# Patient Record
Sex: MALE | URBAN - METROPOLITAN AREA
[De-identification: names, ages, dates, MRNs, and addresses within clinical notes are randomized per-mention and may not be internally consistent; named-entity substitution may affect disease eponyms.]

---

## 2023-06-21 ENCOUNTER — OFFICE VISIT (OUTPATIENT)
Dept: URBAN - METROPOLITAN AREA CLINIC 115 | Facility: CLINIC | Age: 53
End: 2023-06-21

## 2023-06-30 ENCOUNTER — OFFICE VISIT (OUTPATIENT)
Dept: URBAN - METROPOLITAN AREA CLINIC 115 | Facility: CLINIC | Age: 53
End: 2023-06-30
Payer: COMMERCIAL

## 2023-06-30 ENCOUNTER — WEB ENCOUNTER (OUTPATIENT)
Dept: URBAN - METROPOLITAN AREA CLINIC 115 | Facility: CLINIC | Age: 53
End: 2023-06-30

## 2023-06-30 ENCOUNTER — LAB OUTSIDE AN ENCOUNTER (OUTPATIENT)
Dept: URBAN - METROPOLITAN AREA CLINIC 115 | Facility: CLINIC | Age: 53
End: 2023-06-30

## 2023-06-30 VITALS
TEMPERATURE: 97.87 F | WEIGHT: 158 LBS | HEART RATE: 74 BPM | HEIGHT: 66 IN | BODY MASS INDEX: 25.39 KG/M2 | SYSTOLIC BLOOD PRESSURE: 125 MMHG | DIASTOLIC BLOOD PRESSURE: 83 MMHG

## 2023-06-30 DIAGNOSIS — Z12.11 COLON CANCER SCREENING: ICD-10-CM

## 2023-06-30 DIAGNOSIS — R10.13 EPIGASTRIC ABDOMINAL PAIN: ICD-10-CM

## 2023-06-30 DIAGNOSIS — K58.8 OTHER IRRITABLE BOWEL SYNDROME: ICD-10-CM

## 2023-06-30 PROBLEM — 12063002: Status: ACTIVE | Noted: 2023-06-30

## 2023-06-30 PROBLEM — 88111009: Status: ACTIVE | Noted: 2023-06-30

## 2023-06-30 PROBLEM — 10743008: Status: ACTIVE | Noted: 2023-06-30

## 2023-06-30 PROCEDURE — 99204 OFFICE O/P NEW MOD 45 MIN: CPT | Performed by: INTERNAL MEDICINE

## 2023-06-30 PROCEDURE — 99244 OFF/OP CNSLTJ NEW/EST MOD 40: CPT | Performed by: INTERNAL MEDICINE

## 2023-06-30 RX ORDER — HYOSCYAMINE SULFATE 0.12 MG/1
1 TABLET UNDER THE TONGUE AND ALLOW TO DISSOLVE  AS NEEDED FOR CRAMPING PAIN TABLET, ORALLY DISINTEGRATING ORAL THREE TIMES A DAY
Qty: 60 TABLET | Refills: 2 | OUTPATIENT
Start: 2023-06-30 | End: 2023-09-28

## 2023-06-30 RX ORDER — SODIUM, POTASSIUM,MAG SULFATES 17.5-3.13G
354 ML SOLUTION, RECONSTITUTED, ORAL ORAL
Qty: 1 KIT | Refills: 0 | OUTPATIENT
Start: 2023-06-30 | End: 2023-07-01

## 2023-06-30 NOTE — PHYSICAL EXAM GASTROINTESTINAL
Abdomen , soft, nontender, nondistended , no guarding or rigidity , no masses palpable , normal bowel sounds , Liver and Spleen , no hepatomegaly present , no hepatosplenomegaly , liver nontender , spleen not palpable  rectal : deferred

## 2023-06-30 NOTE — HPI-TODAY'S VISIT:
This patient was referred by Dr. Scott SANCHEZ, DANO Whitley for evaluation of weight loss . The copy of this note will be sent to the referring provider. 54 yo m here to get evaluation for weight loss 30 lbs.  He reports un intentional. C/o nausea and epigastric discomfort and lack of appetite. He was going through divorce and admits anxiety and depression. Anti depressants did not helpe him. But using THC gummies. He reports c/o rectal bleeding intermittent more with hard stools recently. He  reports having variable consistency stools. Pt had colonoscopy in 2012 , at that time he was dx with hemorroids.  Denies any fhx of colon cancer first degree relative. He was taking NSAIDS and has cut down. Denies any etoh use and reports anxiety under control.

## 2023-07-06 ENCOUNTER — LAB OUTSIDE AN ENCOUNTER (OUTPATIENT)
Dept: URBAN - METROPOLITAN AREA CLINIC 82 | Facility: CLINIC | Age: 53
End: 2023-07-06

## 2023-07-06 ENCOUNTER — OFFICE VISIT (OUTPATIENT)
Dept: URBAN - METROPOLITAN AREA SURGERY CENTER 13 | Facility: SURGERY CENTER | Age: 53
End: 2023-07-06
Payer: COMMERCIAL

## 2023-07-06 ENCOUNTER — TELEPHONE ENCOUNTER (OUTPATIENT)
Dept: URBAN - METROPOLITAN AREA CLINIC 82 | Facility: CLINIC | Age: 53
End: 2023-07-06

## 2023-07-06 ENCOUNTER — CLAIMS CREATED FROM THE CLAIM WINDOW (OUTPATIENT)
Dept: URBAN - METROPOLITAN AREA CLINIC 4 | Facility: CLINIC | Age: 53
End: 2023-07-06
Payer: COMMERCIAL

## 2023-07-06 DIAGNOSIS — K21.9 GASTRO-ESOPHAGEAL REFLUX DISEASE WITHOUT ESOPHAGITIS: ICD-10-CM

## 2023-07-06 DIAGNOSIS — K29.60 OTHER GASTRITIS WITHOUT BLEEDING: ICD-10-CM

## 2023-07-06 DIAGNOSIS — Z12.11 COLON CANCER SCREENING: ICD-10-CM

## 2023-07-06 DIAGNOSIS — K21.9 ESOPHAGEAL REFLUX: ICD-10-CM

## 2023-07-06 DIAGNOSIS — K30 DYSPEPSIA: ICD-10-CM

## 2023-07-06 DIAGNOSIS — K29.70 GASTRITIS, UNSPECIFIED, WITHOUT BLEEDING: ICD-10-CM

## 2023-07-06 PROBLEM — 89362005: Status: ACTIVE | Noted: 2023-07-06

## 2023-07-06 PROCEDURE — 88312 SPECIAL STAINS GROUP 1: CPT | Performed by: PATHOLOGY

## 2023-07-06 PROCEDURE — G8907 PT DOC NO EVENTS ON DISCHARG: HCPCS | Performed by: INTERNAL MEDICINE

## 2023-07-06 PROCEDURE — 43239 EGD BIOPSY SINGLE/MULTIPLE: CPT | Performed by: INTERNAL MEDICINE

## 2023-07-06 PROCEDURE — 88313 SPECIAL STAINS GROUP 2: CPT | Performed by: PATHOLOGY

## 2023-07-06 PROCEDURE — 45378 DIAGNOSTIC COLONOSCOPY: CPT | Performed by: INTERNAL MEDICINE

## 2023-07-06 PROCEDURE — 88342 IMHCHEM/IMCYTCHM 1ST ANTB: CPT | Performed by: PATHOLOGY

## 2023-07-06 PROCEDURE — 88305 TISSUE EXAM BY PATHOLOGIST: CPT | Performed by: PATHOLOGY

## 2023-07-06 RX ORDER — HYOSCYAMINE SULFATE 0.12 MG/1
1 TABLET UNDER THE TONGUE AND ALLOW TO DISSOLVE  AS NEEDED FOR CRAMPING PAIN TABLET, ORALLY DISINTEGRATING ORAL THREE TIMES A DAY
Qty: 60 TABLET | Refills: 2 | Status: ACTIVE | COMMUNITY
Start: 2023-06-30 | End: 2023-09-28

## 2023-07-31 ENCOUNTER — TELEPHONE ENCOUNTER (OUTPATIENT)
Dept: URBAN - METROPOLITAN AREA CLINIC 115 | Facility: CLINIC | Age: 53
End: 2023-07-31

## 2023-08-01 ENCOUNTER — TELEPHONE ENCOUNTER (OUTPATIENT)
Dept: URBAN - METROPOLITAN AREA CLINIC 115 | Facility: CLINIC | Age: 53
End: 2023-08-01

## 2023-09-26 ENCOUNTER — TELEPHONE ENCOUNTER (OUTPATIENT)
Dept: URBAN - METROPOLITAN AREA CLINIC 115 | Facility: CLINIC | Age: 53
End: 2023-09-26

## 2023-09-29 ENCOUNTER — OFFICE VISIT (OUTPATIENT)
Dept: URBAN - METROPOLITAN AREA CLINIC 115 | Facility: CLINIC | Age: 53
End: 2023-09-29
Payer: COMMERCIAL

## 2023-09-29 ENCOUNTER — TELEPHONE ENCOUNTER (OUTPATIENT)
Dept: URBAN - NONMETROPOLITAN AREA CLINIC 2 | Facility: CLINIC | Age: 53
End: 2023-09-29

## 2023-09-29 VITALS
TEMPERATURE: 98.2 F | HEART RATE: 99 BPM | WEIGHT: 160 LBS | BODY MASS INDEX: 25.71 KG/M2 | SYSTOLIC BLOOD PRESSURE: 124 MMHG | HEIGHT: 66 IN | DIASTOLIC BLOOD PRESSURE: 86 MMHG

## 2023-09-29 DIAGNOSIS — D18.03 LIVER HEMANGIOMA: ICD-10-CM

## 2023-09-29 DIAGNOSIS — K58.2 IRRITABLE BOWEL SYNDROME WITH BOTH CONSTIPATION AND DIARRHEA: ICD-10-CM

## 2023-09-29 DIAGNOSIS — K21.00 GASTROESOPHAGEAL REFLUX DISEASE WITH ESOPHAGITIS WITHOUT HEMORRHAGE: ICD-10-CM

## 2023-09-29 DIAGNOSIS — K64.4 EXTERNAL HEMORRHOID: ICD-10-CM

## 2023-09-29 DIAGNOSIS — K29.60 EROSIVE GASTRITIS: ICD-10-CM

## 2023-09-29 DIAGNOSIS — R63.4 WEIGHT LOSS: ICD-10-CM

## 2023-09-29 DIAGNOSIS — R10.13 DYSPEPSIA: ICD-10-CM

## 2023-09-29 PROBLEM — 10743008: Status: ACTIVE | Noted: 2023-09-29

## 2023-09-29 PROBLEM — 162031009: Status: ACTIVE | Noted: 2023-09-29

## 2023-09-29 PROBLEM — 1086791000119100: Status: ACTIVE | Noted: 2023-09-29

## 2023-09-29 PROBLEM — 23913003: Status: ACTIVE | Noted: 2023-09-29

## 2023-09-29 PROBLEM — 266433003: Status: ACTIVE | Noted: 2023-09-29

## 2023-09-29 PROCEDURE — 99214 OFFICE O/P EST MOD 30 MIN: CPT | Performed by: INTERNAL MEDICINE

## 2023-09-29 RX ORDER — AMITRIPTYLINE HYDROCHLORIDE 10 MG/1
1 TABLET AT BEDTIME TABLET, FILM COATED ORAL ONCE A DAY
Qty: 30 TABLET | Refills: 5 | OUTPATIENT
Start: 2023-09-29

## 2023-09-29 RX ORDER — ESOMEPRAZOLE MAGNESIUM 40 MG/1
1 CAPSULE CAPSULE, DELAYED RELEASE ORAL ONCE A DAY
Qty: 90 CAPSULE | Refills: 3 | OUTPATIENT
Start: 2023-09-29

## 2023-09-29 RX ORDER — HYDROCORTISONE 25 MG/G
1 APPLICATION CREAM TOPICAL TWICE A DAY
Qty: 60 GRAM | Refills: 3 | OUTPATIENT
Start: 2023-09-29 | End: 2024-01-26

## 2023-09-29 RX ORDER — HYOSCYAMINE SULFATE 0.12 MG/1
1 TABLET UNDER THE TONGUE AND ALLOW TO DISSOLVE  AS NEEDED FOR CRAMPING PAIN TABLET, ORALLY DISINTEGRATING ORAL THREE TIMES A DAY
Qty: 60 TABLET | Refills: 2
Start: 2023-06-30 | End: 2023-12-28

## 2023-09-29 RX ORDER — DICYCLOMINE HYDROCHLORIDE 10 MG/1
TAKE 1 CAPSULE BY MOUTH THREE TIMES A DAY CAPSULE ORAL
Qty: 90 CAPSULE | Refills: 1 | OUTPATIENT
Start: 2023-09-29 | End: 2023-11-27

## 2023-09-29 NOTE — HPI-TODAY'S VISIT:
This patient was referred by Dr. Scott SANCHEZ, DANO Whitley for evaluation of weight loss . The copy of this note will be sent to the referring provider. 52 yo m here to get evaluation for weight loss 30 lbs.  He reports un intentional. C/o nausea and epigastric discomfort and lack of appetite. He was going through divorce and admits anxiety and depression. Anti depressants did not helpe him. But using THC gummies. He reports c/o rectal bleeding intermittent more with hard stools recently. He  reports having variable consistency stools. Pt had colonoscopy in 2012 , at that time he was dx with hemorroids.  Denies any fhx of colon cancer first degree relative. He was taking NSAIDS and has cut down. Denies any etoh use and reports anxiety under control.  9/29/23 :

## 2023-09-29 NOTE — HPI-TODAY'S VISIT:
Mr. Pimentel was seen today for follow-up regarding his symptoms of abdominal pain bloating discomfort as well as cramping.  Patient stated.  He continues to have diarrhea episodes with intermittently.  Patient reports taking Imodium and can cause symptoms of lower abdominal bloating and discomfort.  Patient was also noted to have a reflux esophagitis as well as chronic gastritis.  Patient denies taking any iron supplements.  Patient stated he has been having more frequent loose stools and abdominal bloating.  Denies any recent antibiotic use.  Denies any sick contacts.  CT scan of the abdomen pelvis that was done recently showed 1 cm hepatic hemangioma.  He reports having weight loss ,Patient denies any daily use of NSAIDs.

## 2023-10-06 ENCOUNTER — TELEPHONE ENCOUNTER (OUTPATIENT)
Dept: URBAN - METROPOLITAN AREA CLINIC 92 | Facility: CLINIC | Age: 53
End: 2023-10-06

## 2023-10-06 RX ORDER — PANTOPRAZOLE SODIUM 40 MG/1
1 TABLET TABLET, DELAYED RELEASE ORAL ONCE A DAY
Qty: 90 TABLET | Refills: 1 | OUTPATIENT
Start: 2023-10-06

## 2023-12-13 ENCOUNTER — DASHBOARD ENCOUNTERS (OUTPATIENT)
Age: 53
End: 2023-12-13

## 2023-12-13 ENCOUNTER — LAB OUTSIDE AN ENCOUNTER (OUTPATIENT)
Dept: URBAN - METROPOLITAN AREA CLINIC 115 | Facility: CLINIC | Age: 53
End: 2023-12-13

## 2023-12-13 ENCOUNTER — OFFICE VISIT (OUTPATIENT)
Dept: URBAN - METROPOLITAN AREA CLINIC 115 | Facility: CLINIC | Age: 53
End: 2023-12-13
Payer: COMMERCIAL

## 2023-12-13 VITALS
WEIGHT: 156 LBS | SYSTOLIC BLOOD PRESSURE: 124 MMHG | TEMPERATURE: 97.5 F | HEIGHT: 66 IN | DIASTOLIC BLOOD PRESSURE: 81 MMHG | BODY MASS INDEX: 25.07 KG/M2 | HEART RATE: 74 BPM

## 2023-12-13 DIAGNOSIS — K58.2 IRRITABLE BOWEL SYNDROME WITH BOTH CONSTIPATION AND DIARRHEA: ICD-10-CM

## 2023-12-13 DIAGNOSIS — R10.13 DYSPEPSIA: ICD-10-CM

## 2023-12-13 DIAGNOSIS — D18.03 LIVER HEMANGIOMA: ICD-10-CM

## 2023-12-13 DIAGNOSIS — K21.00 GASTROESOPHAGEAL REFLUX DISEASE WITH ESOPHAGITIS WITHOUT HEMORRHAGE: ICD-10-CM

## 2023-12-13 DIAGNOSIS — K29.60 EROSIVE GASTRITIS: ICD-10-CM

## 2023-12-13 DIAGNOSIS — K64.4 EXTERNAL HEMORRHOID: ICD-10-CM

## 2023-12-13 DIAGNOSIS — R63.4 WEIGHT LOSS: ICD-10-CM

## 2023-12-13 PROCEDURE — 99214 OFFICE O/P EST MOD 30 MIN: CPT | Performed by: INTERNAL MEDICINE

## 2023-12-13 RX ORDER — HYDROCORTISONE 25 MG/G
1 APPLICATION CREAM TOPICAL TWICE A DAY
Qty: 60 GRAM | Refills: 3 | Status: ACTIVE | COMMUNITY
Start: 2023-09-29 | End: 2024-01-26

## 2023-12-13 RX ORDER — ESOMEPRAZOLE MAGNESIUM 20 MG/1
1 CAPSULE CAPSULE, DELAYED RELEASE ORAL ONCE A DAY
Qty: 90 CAPSULE | Refills: 3 | OUTPATIENT

## 2023-12-13 RX ORDER — PANTOPRAZOLE SODIUM 40 MG/1
1 TABLET TABLET, DELAYED RELEASE ORAL ONCE A DAY
Qty: 90 TABLET | Refills: 1 | Status: ACTIVE | COMMUNITY
Start: 2023-10-06

## 2023-12-13 RX ORDER — AMITRIPTYLINE HYDROCHLORIDE 10 MG/1
1 TABLET AT BEDTIME TABLET, FILM COATED ORAL ONCE A DAY
Qty: 30 TABLET | Refills: 5 | Status: ACTIVE | COMMUNITY
Start: 2023-09-29

## 2023-12-13 RX ORDER — HYDROCORTISONE 25 MG/G
1 APPLICATION CREAM TOPICAL TWICE A DAY
Qty: 60 GRAM | Refills: 3 | OUTPATIENT

## 2023-12-13 RX ORDER — HYOSCYAMINE SULFATE 0.12 MG/1
1 TABLET UNDER THE TONGUE AND ALLOW TO DISSOLVE  AS NEEDED FOR CRAMPING PAIN TABLET, ORALLY DISINTEGRATING ORAL THREE TIMES A DAY
Qty: 60 TABLET | Refills: 2 | Status: ACTIVE | COMMUNITY
Start: 2023-06-30 | End: 2023-12-28

## 2023-12-13 RX ORDER — ESOMEPRAZOLE MAGNESIUM 40 MG/1
1 CAPSULE CAPSULE, DELAYED RELEASE ORAL ONCE A DAY
Qty: 90 CAPSULE | Refills: 3 | Status: ACTIVE | COMMUNITY
Start: 2023-09-29

## 2023-12-13 NOTE — HPI-TODAY'S VISIT:
Mr. Pimentel was seen today for follow-up regarding his symptoms of abdominal pain bloating discomfort as well as cramping.  Patient stated.  He continues to have diarrhea episodes with intermittently.  Patient reports taking Imodium and can cause symptoms of lower abdominal bloating and discomfort.  Patient was also noted to have a reflux esophagitis as well as chronic gastritis.  Patient denies taking any iron supplements.  Patient stated he has been having more frequent loose stools and abdominal bloating.  Denies any recent antibiotic use.  Denies any sick contacts.  CT scan of the abdomen pelvis that was done recently showed 1 cm hepatic hemangioma.  He reports having weight loss ,Patient denies any daily use of NSAIDs.  12/13/23; Patient was seen today for follow-up he reports is still have less appetite and feels nauseous at some time.  He he was wondering if he can get the hiatal hernia repair which would help with his nausea symptoms.  He is uses Zofran intermittently.  He did not like labs and it caused him some discomfort after using it for cramping.  Patient has not had any weight loss since he has started seeing me in the past 6 months.  Prior to that he lost about 30 pounds but however he also admits that he was going to stress and do worse at that time.  Patient stated he used to be more muscular and he lost some muscle mass and he would like to gain some of the muscle mass.  Patient was given amitriptyline during the last office visit and patient stated he has not sure if he has it has helped him in any way.  He denies any insomnia.  He reports epigastric fullness and nausea.  Prior imaging studies were all reviewed including the CT scan with IV contrast of the abdomen which were all unremarkable.  Other complaints includes abdominal bloating constipation and loose stools.  Denies any rectal bleeding recent colonoscopy and endoscopy were all reviewed.

## 2023-12-13 NOTE — HPI-TODAY'S VISIT:
This patient was referred by Dr. Scott SANCHEZ, DANO Whitley for evaluation of weight loss . The copy of this note will be sent to the referring provider. 54 yo m here to get evaluation for weight loss 30 lbs.  He reports un intentional. C/o nausea and epigastric discomfort and lack of appetite. He was going through divorce and admits anxiety and depression. Anti depressants did not helpe him. But using THC gummies. He reports c/o rectal bleeding intermittent more with hard stools recently. He  reports having variable consistency stools. Pt had colonoscopy in 2012 , at that time he was dx with hemorroids.  Denies any fhx of colon cancer first degree relative. He was taking NSAIDS and has cut down. Denies any etoh use and reports anxiety under control.  9/29/23 :

## 2023-12-14 LAB
A/G RATIO: 1.7
ABSOLUTE BASOPHILS: 32
ABSOLUTE EOSINOPHILS: 90
ABSOLUTE LYMPHOCYTES: 1420
ABSOLUTE MONOCYTES: 668
ABSOLUTE NEUTROPHILS: 3090
ALBUMIN: 4.3
ALKALINE PHOSPHATASE: 36
ALT (SGPT): 41
AST (SGOT): 39
BASOPHILS: 0.6
BILIRUBIN, TOTAL: 0.3
BUN/CREATININE RATIO: (no result)
BUN: 10
CALCIUM: 9.1
CARBON DIOXIDE, TOTAL: 28
CHLORIDE: 103
CREATININE: 0.91
EGFR: 100
EOSINOPHILS: 1.7
GLOBULIN, TOTAL: 2.5
GLUCOSE: 84
HEMATOCRIT: 42.8
HEMOGLOBIN: 14.3
LYMPHOCYTES: 26.8
MCH: 29.4
MCHC: 33.4
MCV: 87.9
MONOCYTES: 12.6
MPV: 9.8
NEUTROPHILS: 58.3
PLATELET COUNT: 235
POTASSIUM: 4.4
PROTEIN, TOTAL: 6.8
RDW: 12.9
RED BLOOD CELL COUNT: 4.87
SODIUM: 139
TSH: 1.2
WHITE BLOOD CELL COUNT: 5.3

## 2024-01-05 ENCOUNTER — TELEPHONE ENCOUNTER (OUTPATIENT)
Dept: URBAN - METROPOLITAN AREA CLINIC 113 | Facility: CLINIC | Age: 54
End: 2024-01-05

## 2024-01-30 ENCOUNTER — TELEPHONE ENCOUNTER (OUTPATIENT)
Dept: URBAN - METROPOLITAN AREA CLINIC 115 | Facility: CLINIC | Age: 54
End: 2024-01-30

## 2024-06-04 ENCOUNTER — TELEPHONE ENCOUNTER (OUTPATIENT)
Dept: URBAN - METROPOLITAN AREA CLINIC 92 | Facility: CLINIC | Age: 54
End: 2024-06-04

## 2024-06-04 RX ORDER — LIDOCAINE 50 MG/G
1 APPLICATION AS NEEDED OINTMENT TOPICAL THREE TIMES A DAY
Qty: 60 GRAM | Refills: 1 | OUTPATIENT
Start: 2024-06-07

## 2024-06-10 ENCOUNTER — TELEPHONE ENCOUNTER (OUTPATIENT)
Dept: URBAN - METROPOLITAN AREA CLINIC 92 | Facility: CLINIC | Age: 54
End: 2024-06-10

## 2024-06-27 ENCOUNTER — OFFICE VISIT (OUTPATIENT)
Dept: URBAN - METROPOLITAN AREA CLINIC 115 | Facility: CLINIC | Age: 54
End: 2024-06-27

## 2024-07-05 ENCOUNTER — LAB OUTSIDE AN ENCOUNTER (OUTPATIENT)
Dept: URBAN - METROPOLITAN AREA CLINIC 82 | Facility: CLINIC | Age: 54
End: 2024-07-05

## 2024-07-05 ENCOUNTER — OFFICE VISIT (OUTPATIENT)
Dept: URBAN - METROPOLITAN AREA CLINIC 82 | Facility: CLINIC | Age: 54
End: 2024-07-05
Payer: COMMERCIAL

## 2024-07-05 VITALS
HEIGHT: 66 IN | DIASTOLIC BLOOD PRESSURE: 83 MMHG | BODY MASS INDEX: 25.71 KG/M2 | WEIGHT: 160 LBS | TEMPERATURE: 97.7 F | HEART RATE: 54 BPM | SYSTOLIC BLOOD PRESSURE: 128 MMHG

## 2024-07-05 DIAGNOSIS — R10.13 DYSPEPSIA: ICD-10-CM

## 2024-07-05 DIAGNOSIS — K21.00 GASTROESOPHAGEAL REFLUX DISEASE WITH ESOPHAGITIS WITHOUT HEMORRHAGE: ICD-10-CM

## 2024-07-05 DIAGNOSIS — K64.4 EXTERNAL HEMORRHOID: ICD-10-CM

## 2024-07-05 DIAGNOSIS — K29.60 EROSIVE GASTRITIS: ICD-10-CM

## 2024-07-05 DIAGNOSIS — K86.2 PANCREATIC CYST: ICD-10-CM

## 2024-07-05 DIAGNOSIS — D18.03 LIVER HEMANGIOMA: ICD-10-CM

## 2024-07-05 DIAGNOSIS — R63.4 WEIGHT LOSS: ICD-10-CM

## 2024-07-05 DIAGNOSIS — K58.2 IRRITABLE BOWEL SYNDROME WITH BOTH CONSTIPATION AND DIARRHEA: ICD-10-CM

## 2024-07-05 PROCEDURE — 99214 OFFICE O/P EST MOD 30 MIN: CPT | Performed by: INTERNAL MEDICINE

## 2024-07-05 RX ORDER — LIDOCAINE 50 MG/G
1 APPLICATION AS NEEDED OINTMENT TOPICAL THREE TIMES A DAY
Qty: 60 GRAM | Refills: 1 | Status: ACTIVE | COMMUNITY
Start: 2024-06-07

## 2024-07-05 RX ORDER — ESOMEPRAZOLE MAGNESIUM 20 MG/1
1 CAPSULE CAPSULE, DELAYED RELEASE ORAL ONCE A DAY
Qty: 90 CAPSULE | Refills: 3 | Status: ACTIVE | COMMUNITY

## 2024-07-05 RX ORDER — AMITRIPTYLINE HYDROCHLORIDE 10 MG/1
1 TABLET AT BEDTIME TABLET, FILM COATED ORAL ONCE A DAY
Qty: 30 TABLET | Refills: 5 | Status: ACTIVE | COMMUNITY
Start: 2023-09-29

## 2024-07-05 RX ORDER — PANTOPRAZOLE SODIUM 40 MG/1
TAKE 1 TABLET BY MOUTH EVERY DAY FOR 90 DAYS TABLET, DELAYED RELEASE ORAL
Qty: 90 TABLET | Refills: 1 | Status: ACTIVE | COMMUNITY

## 2024-07-05 RX ORDER — HYDROCORTISONE 25 MG/G
1 APPLICATION CREAM TOPICAL TWICE A DAY
Qty: 60 GRAM | Refills: 3 | Status: ACTIVE | COMMUNITY

## 2024-07-05 NOTE — HPI-TODAY'S VISIT:
Mr. Pimentel was seen today for follow-up regarding his symptoms of abdominal pain bloating discomfort as well as cramping.  Patient stated.  He continues to have diarrhea episodes with intermittently.  Patient reports taking Imodium and can cause symptoms of lower abdominal bloating and discomfort.  Patient was also noted to have a reflux esophagitis as well as chronic gastritis.  Patient denies taking any iron supplements.  Patient stated he has been having more frequent loose stools and abdominal bloating.  Denies any recent antibiotic use.  Denies any sick contacts.  CT scan of the abdomen pelvis that was done recently showed 1 cm hepatic hemangioma.  He reports having weight loss ,Patient denies any daily use of NSAIDs.  12/13/23; Patient was seen today for follow-up he reports is still have less appetite and feels nauseous at some time.  He he was wondering if he can get the hiatal hernia repair which would help with his nausea symptoms.  He is uses Zofran intermittently.  He did not like labs and it caused him some discomfort after using it for cramping.  Patient has not had any weight loss since he has started seeing me in the past 6 months.  Prior to that he lost about 30 pounds but however he also admits that he was going to stress and do worse at that time.  Patient stated he used to be more muscular and he lost some muscle mass and he would like to gain some of the muscle mass.  Patient was given amitriptyline during the last office visit and patient stated he has not sure if he has it has helped him in any way.  He denies any insomnia.  He reports epigastric fullness and nausea.  Prior imaging studies were all reviewed including the CT scan with IV contrast of the abdomen which were all unremarkable.  Other complaints includes abdominal bloating constipation and loose stools.  Denies any rectal bleeding recent colonoscopy and endoscopy were all reviewed.  7/5/2024 Patient presents for a EUS consultation. Patient had an MRI 4/22/2024 which showed cystic lesion in the tail of the pancreas. Patient says that he recently lost 30lbs without trying to and said that he had loss of appetite and has been having nausea. He says that his stool has been dark and coming out in "pellets" and has been having mucous and blood in the stool, but he has not been experiencing diarrhea. He started doing THC to gain back his apetite. He says that his PCP saw that he had a hiatal hernia. Patient says that he has tried having a marijuana edible in the past to gain appetite. Patient denies having depression and anxiety, but does say that he has been having stress. Patient is on pantoprazole.
This patient was referred by Dr. Scott SANCEHZ, DANO Whitley for evaluation of weight loss . The copy of this note will be sent to the referring provider. 52 yo m here to get evaluation for weight loss 30 lbs.  He reports un intentional. C/o nausea and epigastric discomfort and lack of appetite. He was going through divorce and admits anxiety and depression. Anti depressants did not helpe him. But using THC gummies. He reports c/o rectal bleeding intermittent more with hard stools recently. He  reports having variable consistency stools. Pt had colonoscopy in 2012 , at that time he was dx with hemorroids.  Denies any fhx of colon cancer first degree relative. He was taking NSAIDS and has cut down. Denies any etoh use and reports anxiety under control.  9/29/23 :
150 feet

## 2024-07-19 ENCOUNTER — TELEPHONE ENCOUNTER (OUTPATIENT)
Dept: URBAN - METROPOLITAN AREA CLINIC 82 | Facility: CLINIC | Age: 54
End: 2024-07-19

## 2024-08-23 ENCOUNTER — OFFICE VISIT (OUTPATIENT)
Dept: URBAN - METROPOLITAN AREA MEDICAL CENTER 28 | Facility: MEDICAL CENTER | Age: 54
End: 2024-08-23
Payer: COMMERCIAL

## 2024-08-23 DIAGNOSIS — R93.3 ABN FINDINGS-GI TRACT: ICD-10-CM

## 2024-08-23 DIAGNOSIS — K86.89 OTHER SPECIFIED DISEASES OF PANCREAS: ICD-10-CM

## 2024-08-23 PROCEDURE — 43259 EGD US EXAM DUODENUM/JEJUNUM: CPT | Performed by: INTERNAL MEDICINE

## 2024-08-23 RX ORDER — AMITRIPTYLINE HYDROCHLORIDE 10 MG/1
1 TABLET AT BEDTIME TABLET, FILM COATED ORAL ONCE A DAY
Qty: 30 TABLET | Refills: 5 | Status: ACTIVE | COMMUNITY
Start: 2023-09-29

## 2024-08-23 RX ORDER — LIDOCAINE 50 MG/G
1 APPLICATION AS NEEDED OINTMENT TOPICAL THREE TIMES A DAY
Qty: 60 GRAM | Refills: 1 | Status: ACTIVE | COMMUNITY
Start: 2024-06-07

## 2024-08-23 RX ORDER — ESOMEPRAZOLE MAGNESIUM 20 MG/1
1 CAPSULE CAPSULE, DELAYED RELEASE ORAL ONCE A DAY
Qty: 90 CAPSULE | Refills: 3 | Status: ACTIVE | COMMUNITY

## 2024-08-23 RX ORDER — HYDROCORTISONE 25 MG/G
1 APPLICATION CREAM TOPICAL TWICE A DAY
Qty: 60 GRAM | Refills: 3 | Status: ACTIVE | COMMUNITY

## 2024-08-23 RX ORDER — PANTOPRAZOLE SODIUM 40 MG/1
TAKE 1 TABLET BY MOUTH EVERY DAY FOR 90 DAYS TABLET, DELAYED RELEASE ORAL
Qty: 90 TABLET | Refills: 1 | Status: ACTIVE | COMMUNITY

## 2024-10-15 ENCOUNTER — TELEPHONE ENCOUNTER (OUTPATIENT)
Dept: URBAN - METROPOLITAN AREA CLINIC 82 | Facility: CLINIC | Age: 54
End: 2024-10-15

## 2024-10-15 RX ORDER — ESOMEPRAZOLE MAGNESIUM 20 MG/1
1 CAPSULE CAPSULE, DELAYED RELEASE ORAL ONCE A DAY
OUTPATIENT

## 2024-10-15 RX ORDER — PANTOPRAZOLE SODIUM 40 MG/1
TAKE 1 TABLET BY MOUTH EVERY DAY FOR 90 DAYS TABLET, DELAYED RELEASE ORAL
Qty: 90 TABLET | Refills: 1

## 2024-10-17 ENCOUNTER — ERX REFILL RESPONSE (OUTPATIENT)
Dept: URBAN - METROPOLITAN AREA CLINIC 82 | Facility: CLINIC | Age: 54
End: 2024-10-17

## 2024-10-17 RX ORDER — PANTOPRAZOLE SODIUM 40 MG/1
TAKE 1 TABLET BY MOUTH EVERY DAY FOR 90 DAYS TABLET, DELAYED RELEASE ORAL
Qty: 90 TABLET | Refills: 1 | OUTPATIENT

## 2024-10-17 RX ORDER — PANTOPRAZOLE SODIUM 40 MG/1
TAKE 1 TABLET BY MOUTH EVERY DAY TABLET, DELAYED RELEASE ORAL
Qty: 90 TABLET | Refills: 1 | OUTPATIENT

## 2025-01-30 ENCOUNTER — TELEPHONE ENCOUNTER (OUTPATIENT)
Dept: URBAN - METROPOLITAN AREA CLINIC 92 | Facility: CLINIC | Age: 55
End: 2025-01-30

## 2025-01-30 RX ORDER — HYDROCORTISONE 25 MG/G
1 APPLICATION CREAM TOPICAL TWICE A DAY
Qty: 60 GRAM | Refills: 3
End: 2025-05-30

## 2025-04-22 ENCOUNTER — TELEPHONE ENCOUNTER (OUTPATIENT)
Dept: URBAN - METROPOLITAN AREA CLINIC 92 | Facility: CLINIC | Age: 55
End: 2025-04-22

## 2025-04-22 RX ORDER — PANTOPRAZOLE SODIUM 40 MG/1
TAKE 1 TABLET BY MOUTH EVERY DAY TABLET, DELAYED RELEASE ORAL
Qty: 90 TABLET | Refills: 1

## 2025-04-24 ENCOUNTER — ERX REFILL RESPONSE (OUTPATIENT)
Dept: URBAN - METROPOLITAN AREA CLINIC 82 | Facility: CLINIC | Age: 55
End: 2025-04-24

## 2025-04-24 RX ORDER — PANTOPRAZOLE SODIUM 40 MG/1
TAKE 1 TABLET BY MOUTH EVERY DAY TABLET, DELAYED RELEASE ORAL
Qty: 90 TABLET | Refills: 1 | OUTPATIENT